# Patient Record
Sex: MALE | Race: WHITE | NOT HISPANIC OR LATINO | Employment: FULL TIME | URBAN - METROPOLITAN AREA
[De-identification: names, ages, dates, MRNs, and addresses within clinical notes are randomized per-mention and may not be internally consistent; named-entity substitution may affect disease eponyms.]

---

## 2024-06-20 ENCOUNTER — HOSPITAL ENCOUNTER (EMERGENCY)
Facility: HOSPITAL | Age: 49
Discharge: HOME | End: 2024-06-20
Attending: EMERGENCY MEDICINE
Payer: COMMERCIAL

## 2024-06-20 ENCOUNTER — APPOINTMENT (OUTPATIENT)
Dept: CARDIOLOGY | Facility: HOSPITAL | Age: 49
End: 2024-06-20
Payer: COMMERCIAL

## 2024-06-20 ENCOUNTER — APPOINTMENT (OUTPATIENT)
Dept: RADIOLOGY | Facility: HOSPITAL | Age: 49
End: 2024-06-20
Payer: COMMERCIAL

## 2024-06-20 VITALS
HEIGHT: 72 IN | BODY MASS INDEX: 30.88 KG/M2 | HEART RATE: 62 BPM | TEMPERATURE: 98.4 F | OXYGEN SATURATION: 98 % | RESPIRATION RATE: 17 BRPM | DIASTOLIC BLOOD PRESSURE: 74 MMHG | WEIGHT: 228 LBS | SYSTOLIC BLOOD PRESSURE: 104 MMHG

## 2024-06-20 DIAGNOSIS — N28.9 FUNCTION KIDNEY DECREASED: ICD-10-CM

## 2024-06-20 DIAGNOSIS — T67.5XXA HEAT EXHAUSTION, INITIAL ENCOUNTER: Primary | ICD-10-CM

## 2024-06-20 DIAGNOSIS — R55 NEAR SYNCOPE: ICD-10-CM

## 2024-06-20 LAB
ALBUMIN SERPL BCP-MCNC: 4.4 G/DL (ref 3.4–5)
ALP SERPL-CCNC: 61 U/L (ref 33–120)
ALT SERPL W P-5'-P-CCNC: 13 U/L (ref 10–52)
ANION GAP SERPL CALC-SCNC: 15 MMOL/L (ref 10–20)
AST SERPL W P-5'-P-CCNC: 16 U/L (ref 9–39)
BASOPHILS # BLD AUTO: 0.11 X10*3/UL (ref 0–0.1)
BASOPHILS NFR BLD AUTO: 0.8 %
BILIRUB SERPL-MCNC: 0.6 MG/DL (ref 0–1.2)
BUN SERPL-MCNC: 16 MG/DL (ref 6–23)
CALCIUM SERPL-MCNC: 9.1 MG/DL (ref 8.6–10.3)
CARDIAC TROPONIN I PNL SERPL HS: 5 NG/L (ref 0–20)
CARDIAC TROPONIN I PNL SERPL HS: 6 NG/L (ref 0–20)
CHLORIDE SERPL-SCNC: 105 MMOL/L (ref 98–107)
CO2 SERPL-SCNC: 23 MMOL/L (ref 21–32)
CREAT SERPL-MCNC: 1.43 MG/DL (ref 0.5–1.3)
EGFRCR SERPLBLD CKD-EPI 2021: 60 ML/MIN/1.73M*2
EOSINOPHIL # BLD AUTO: 0.17 X10*3/UL (ref 0–0.7)
EOSINOPHIL NFR BLD AUTO: 1.3 %
ERYTHROCYTE [DISTWIDTH] IN BLOOD BY AUTOMATED COUNT: 13.2 % (ref 11.5–14.5)
GLUCOSE SERPL-MCNC: 105 MG/DL (ref 74–99)
HCT VFR BLD AUTO: 40.9 % (ref 41–52)
HGB BLD-MCNC: 13.6 G/DL (ref 13.5–17.5)
IMM GRANULOCYTES # BLD AUTO: 0.05 X10*3/UL (ref 0–0.7)
IMM GRANULOCYTES NFR BLD AUTO: 0.4 % (ref 0–0.9)
LYMPHOCYTES # BLD AUTO: 3.21 X10*3/UL (ref 1.2–4.8)
LYMPHOCYTES NFR BLD AUTO: 24.1 %
MAGNESIUM SERPL-MCNC: 2 MG/DL (ref 1.6–2.4)
MCH RBC QN AUTO: 29.2 PG (ref 26–34)
MCHC RBC AUTO-ENTMCNC: 33.3 G/DL (ref 32–36)
MCV RBC AUTO: 88 FL (ref 80–100)
MONOCYTES # BLD AUTO: 1.1 X10*3/UL (ref 0.1–1)
MONOCYTES NFR BLD AUTO: 8.3 %
NEUTROPHILS # BLD AUTO: 8.69 X10*3/UL (ref 1.2–7.7)
NEUTROPHILS NFR BLD AUTO: 65.1 %
NRBC BLD-RTO: 0 /100 WBCS (ref 0–0)
PLATELET # BLD AUTO: 285 X10*3/UL (ref 150–450)
POTASSIUM SERPL-SCNC: 3.3 MMOL/L (ref 3.5–5.3)
PROT SERPL-MCNC: 7.6 G/DL (ref 6.4–8.2)
RBC # BLD AUTO: 4.65 X10*6/UL (ref 4.5–5.9)
SODIUM SERPL-SCNC: 140 MMOL/L (ref 136–145)
WBC # BLD AUTO: 13.3 X10*3/UL (ref 4.4–11.3)

## 2024-06-20 PROCEDURE — 2500000001 HC RX 250 WO HCPCS SELF ADMINISTERED DRUGS (ALT 637 FOR MEDICARE OP): Performed by: EMERGENCY MEDICINE

## 2024-06-20 PROCEDURE — 84484 ASSAY OF TROPONIN QUANT: CPT | Performed by: STUDENT IN AN ORGANIZED HEALTH CARE EDUCATION/TRAINING PROGRAM

## 2024-06-20 PROCEDURE — 71045 X-RAY EXAM CHEST 1 VIEW: CPT | Performed by: RADIOLOGY

## 2024-06-20 PROCEDURE — 71045 X-RAY EXAM CHEST 1 VIEW: CPT

## 2024-06-20 PROCEDURE — 36415 COLL VENOUS BLD VENIPUNCTURE: CPT | Performed by: STUDENT IN AN ORGANIZED HEALTH CARE EDUCATION/TRAINING PROGRAM

## 2024-06-20 PROCEDURE — 99283 EMERGENCY DEPT VISIT LOW MDM: CPT | Mod: 25

## 2024-06-20 PROCEDURE — 93005 ELECTROCARDIOGRAM TRACING: CPT

## 2024-06-20 PROCEDURE — 80053 COMPREHEN METABOLIC PANEL: CPT | Performed by: STUDENT IN AN ORGANIZED HEALTH CARE EDUCATION/TRAINING PROGRAM

## 2024-06-20 PROCEDURE — 85025 COMPLETE CBC W/AUTO DIFF WBC: CPT | Performed by: STUDENT IN AN ORGANIZED HEALTH CARE EDUCATION/TRAINING PROGRAM

## 2024-06-20 PROCEDURE — 99284 EMERGENCY DEPT VISIT MOD MDM: CPT | Performed by: EMERGENCY MEDICINE

## 2024-06-20 PROCEDURE — 83735 ASSAY OF MAGNESIUM: CPT | Performed by: STUDENT IN AN ORGANIZED HEALTH CARE EDUCATION/TRAINING PROGRAM

## 2024-06-20 RX ORDER — POTASSIUM CHLORIDE 1.5 G/1.58G
20 POWDER, FOR SOLUTION ORAL ONCE
Status: COMPLETED | OUTPATIENT
Start: 2024-06-20 | End: 2024-06-20

## 2024-06-20 ASSESSMENT — LIFESTYLE VARIABLES
EVER HAD A DRINK FIRST THING IN THE MORNING TO STEADY YOUR NERVES TO GET RID OF A HANGOVER: NO
HAVE YOU EVER FELT YOU SHOULD CUT DOWN ON YOUR DRINKING: NO
EVER FELT BAD OR GUILTY ABOUT YOUR DRINKING: NO
HAVE PEOPLE ANNOYED YOU BY CRITICIZING YOUR DRINKING: NO
TOTAL SCORE: 0

## 2024-06-20 ASSESSMENT — COLUMBIA-SUICIDE SEVERITY RATING SCALE - C-SSRS
1. IN THE PAST MONTH, HAVE YOU WISHED YOU WERE DEAD OR WISHED YOU COULD GO TO SLEEP AND NOT WAKE UP?: NO
2. HAVE YOU ACTUALLY HAD ANY THOUGHTS OF KILLING YOURSELF?: NO
6. HAVE YOU EVER DONE ANYTHING, STARTED TO DO ANYTHING, OR PREPARED TO DO ANYTHING TO END YOUR LIFE?: NO

## 2024-06-20 ASSESSMENT — PAIN SCALES - GENERAL
PAINLEVEL_OUTOF10: 0 - NO PAIN

## 2024-06-20 ASSESSMENT — PAIN - FUNCTIONAL ASSESSMENT: PAIN_FUNCTIONAL_ASSESSMENT: 0-10

## 2024-06-20 NOTE — ED PROVIDER NOTES
HPI   Chief Complaint   Patient presents with    Weakness, Gen     Pt states that he was working outside all day then went out to eat and was sitting outside the restaurant when he felt weak, dizzy  and hot. Pt states that he did not drink any water today. Pt denies CP and SOB.        48-year-old male present to the emergency room for evaluation of near syncope.  He reporting outside for most the day today with no water intake and only mild consumption of soda.  He attempted to go out for dinner this evening and without drinking any significant amount of alcohol became significantly lightheaded and had to lower himself to the ground before he passed out.  Denies any chest pain, shortness of breath with this.  Denies any previous history of cardiac disease or syncope.  Vital signs are reassuring for EMS and IV established normal saline was initiated.  Normal prehospital glucose.  No actual loss of consciousness or head injury.  He otherwise is asymptomatic at this time.  Previously had some mild nausea and dry heaving when lightheaded but this is since resolved without intervention.                          [place] Coma Scale Score: 15                     Patient History   History reviewed. No pertinent past medical history.  History reviewed. No pertinent surgical history.  No family history on file.  Social History     Tobacco Use    Smoking status: Not on file    Smokeless tobacco: Not on file   Substance Use Topics    Alcohol use: Not on file    Drug use: Not on file       Physical Exam   ED Triage Vitals [06/20/24 1916]   Temperature Heart Rate Respirations BP   36.5 °C (97.7 °F) 78 18 121/78      Pulse Ox Temp Source Heart Rate Source Patient Position   94 % Temporal Monitor Sitting      BP Location FiO2 (%)     Left arm --       Physical Exam  Vitals and nursing note reviewed.   Constitutional:       General: He is not in acute distress.     Appearance: He is well-developed.   HENT:      Head: Normocephalic  and atraumatic.      Nose: No congestion or rhinorrhea.   Eyes:      Conjunctiva/sclera: Conjunctivae normal.   Cardiovascular:      Rate and Rhythm: Normal rate and regular rhythm.      Pulses: Normal pulses.      Heart sounds: No murmur heard.  Pulmonary:      Effort: Pulmonary effort is normal. No respiratory distress.      Breath sounds: Normal breath sounds. No stridor. No wheezing, rhonchi or rales.   Abdominal:      General: Bowel sounds are normal. There is no distension.      Palpations: Abdomen is soft.      Tenderness: There is no abdominal tenderness. There is no guarding or rebound.   Musculoskeletal:         General: No swelling.      Cervical back: Neck supple. No rigidity.      Right lower leg: No edema.      Left lower leg: No edema.   Skin:     General: Skin is warm and dry.      Capillary Refill: Capillary refill takes less than 2 seconds.      Findings: No rash.   Neurological:      Mental Status: He is alert.      Cranial Nerves: No cranial nerve deficit.      Sensory: No sensory deficit.      Motor: No weakness.      Gait: Gait normal.      Comments: Cranial nerves II through XII intact.  Strength 5 out of 5 in all 4 extremities.  Sensation intact to light touch in all 4 extremities.  No dysdiadochokinesia, no dysmetria.  Gait is narrow and stable.   Psychiatric:         Mood and Affect: Mood normal.         Behavior: Behavior normal.         Thought Content: Thought content normal.         ED Course & MDM   ED Course as of 06/20/24 2202   Thu Jun 20, 2024 2015 Mild reduction in kidney function appreciated.  Suspect likely prerenal in the setting of his likely dehydration.  Potassium 3.3 will replete orally at this time. [TL]   2036 No evidence for acute cardiopulmonary process. If there is clinical  concern for acute aortic pathology or pulmonary embolic disease,  further evaluated with chest CT should be considered.   [TL]   2119 Repeat troponin negative. [TL]   2120 Will discharge patient  at this time.  PCP follow-up and repeat kidney function testing in 1 week recommended.  Able to ambulate around the emergency room without difficulty. [TL]      ED Course User Index  [TL] Zach Lopez DO         Diagnoses as of 06/20/24 2202   Heat exhaustion, initial encounter   Near syncope   Function kidney decreased       Medical Decision Making  Well-appearing 48-year-old male presenting for near syncope.  I suspect there is a potential component of heat exhaustion given significant time spent outside in the greater than 90 degree heat today with minimal oral fluid intake.  Thankfully vital signs are reassuring at this time.  Patient is neurologically intact with no sign of heatstroke.  EKG, serial troponin, base laboratory studies to be obtained.  Will continue to monitor the patient at this time.  He has a normal neurologic exam and no sign of acute intracranial hemorrhage, stroke.  No complaints of headache or blurry vision.  These were all considered.  No sign of infectious etiology and patient otherwise been feeling well throughout the last Nayeli days.  He is a fairly healthy gentleman with only past medical history that he endorses of GERD, anxiety, seasonal allergies on Claritin.        Procedure  Procedures     Zach Lopez DO  06/20/24 2202

## 2024-06-21 LAB
ATRIAL RATE: 61 BPM
ATRIAL RATE: 70 BPM
P AXIS: 28 DEGREES
P AXIS: 30 DEGREES
P OFFSET: 194 MS
P OFFSET: 195 MS
P ONSET: 140 MS
P ONSET: 142 MS
PR INTERVAL: 158 MS
PR INTERVAL: 166 MS
Q ONSET: 221 MS
Q ONSET: 223 MS
QRS COUNT: 11 BEATS
QRS COUNT: 11 BEATS
QRS DURATION: 100 MS
QRS DURATION: 102 MS
QT INTERVAL: 396 MS
QT INTERVAL: 424 MS
QTC CALCULATION(BAZETT): 426 MS
QTC CALCULATION(BAZETT): 427 MS
QTC FREDERICIA: 417 MS
QTC FREDERICIA: 426 MS
R AXIS: 16 DEGREES
R AXIS: 4 DEGREES
T AXIS: 32 DEGREES
T AXIS: 35 DEGREES
T OFFSET: 419 MS
T OFFSET: 435 MS
VENTRICULAR RATE: 61 BPM
VENTRICULAR RATE: 70 BPM

## 2024-06-21 NOTE — DISCHARGE INSTRUCTIONS
Follow up with your doctor(s) in one to two days.  Follow up and review all labs and imaging results with your doctor(s)    Please return to this or the nearest Emergency Medical facility for any new or worsening symptoms.    If you were given a prescription medication, you should review all risks and side effects on the package insert and discuss these and the medication with your pharmacist    Please drink plenty of fluids over the next 4 days.  Have your kidney function reassessed in 1 week via lab work from your PCP.

## 2024-09-11 ENCOUNTER — APPOINTMENT (OUTPATIENT)
Dept: CARDIOLOGY | Facility: HOSPITAL | Age: 49
End: 2024-09-11
Payer: COMMERCIAL

## 2024-09-11 ENCOUNTER — APPOINTMENT (OUTPATIENT)
Dept: RADIOLOGY | Facility: HOSPITAL | Age: 49
End: 2024-09-11
Payer: COMMERCIAL

## 2024-09-11 ENCOUNTER — HOSPITAL ENCOUNTER (OUTPATIENT)
Facility: HOSPITAL | Age: 49
Setting detail: OBSERVATION
Discharge: HOME | End: 2024-09-12
Attending: EMERGENCY MEDICINE | Admitting: STUDENT IN AN ORGANIZED HEALTH CARE EDUCATION/TRAINING PROGRAM
Payer: COMMERCIAL

## 2024-09-11 DIAGNOSIS — R07.9 CHEST PAIN ON EXERTION: ICD-10-CM

## 2024-09-11 DIAGNOSIS — R07.89 CHEST DISCOMFORT: ICD-10-CM

## 2024-09-11 DIAGNOSIS — R07.9 CHEST PAIN AT REST: Primary | ICD-10-CM

## 2024-09-11 DIAGNOSIS — R07.9 CHEST PAIN, UNSPECIFIED TYPE: ICD-10-CM

## 2024-09-11 DIAGNOSIS — R00.2 PALPITATIONS: ICD-10-CM

## 2024-09-11 PROBLEM — Z86.39 HISTORY OF OBESITY: Status: ACTIVE | Noted: 2021-06-24

## 2024-09-11 PROBLEM — F98.8 ADD (ATTENTION DEFICIT DISORDER): Status: ACTIVE | Noted: 2024-09-11

## 2024-09-11 PROBLEM — G61.0 GUILLAIN BARRÉ SYNDROME (MULTI): Status: ACTIVE | Noted: 2024-09-11

## 2024-09-11 PROBLEM — K80.50 BILIARY COLIC: Status: ACTIVE | Noted: 2021-06-28

## 2024-09-11 LAB
ALBUMIN SERPL BCP-MCNC: 4.2 G/DL (ref 3.4–5)
ALP SERPL-CCNC: 68 U/L (ref 33–120)
ALT SERPL W P-5'-P-CCNC: 14 U/L (ref 10–52)
ANION GAP SERPL CALC-SCNC: 11 MMOL/L (ref 10–20)
APTT PPP: 27 SECONDS (ref 27–38)
AST SERPL W P-5'-P-CCNC: 18 U/L (ref 9–39)
BASOPHILS # BLD AUTO: 0.08 X10*3/UL (ref 0–0.1)
BASOPHILS NFR BLD AUTO: 0.9 %
BILIRUB SERPL-MCNC: 0.4 MG/DL (ref 0–1.2)
BUN SERPL-MCNC: 13 MG/DL (ref 6–23)
CALCIUM SERPL-MCNC: 9.1 MG/DL (ref 8.6–10.3)
CARDIAC TROPONIN I PNL SERPL HS: 3 NG/L (ref 0–20)
CARDIAC TROPONIN I PNL SERPL HS: 4 NG/L (ref 0–20)
CHLORIDE SERPL-SCNC: 103 MMOL/L (ref 98–107)
CHOLEST SERPL-MCNC: 217 MG/DL (ref 0–199)
CHOLESTEROL/HDL RATIO: 3.4
CO2 SERPL-SCNC: 27 MMOL/L (ref 21–32)
CREAT SERPL-MCNC: 1.13 MG/DL (ref 0.5–1.3)
EGFRCR SERPLBLD CKD-EPI 2021: 79 ML/MIN/1.73M*2
EOSINOPHIL # BLD AUTO: 0.15 X10*3/UL (ref 0–0.7)
EOSINOPHIL NFR BLD AUTO: 1.7 %
ERYTHROCYTE [DISTWIDTH] IN BLOOD BY AUTOMATED COUNT: 12.8 % (ref 11.5–14.5)
GLUCOSE SERPL-MCNC: 92 MG/DL (ref 74–99)
HCT VFR BLD AUTO: 40 % (ref 41–52)
HDLC SERPL-MCNC: 63.7 MG/DL
HGB BLD-MCNC: 13.5 G/DL (ref 13.5–17.5)
HOLD SPECIMEN: NORMAL
HOLD SPECIMEN: NORMAL
IMM GRANULOCYTES # BLD AUTO: 0.05 X10*3/UL (ref 0–0.7)
IMM GRANULOCYTES NFR BLD AUTO: 0.6 % (ref 0–0.9)
INR PPP: 1.1 (ref 0.9–1.1)
LDLC SERPL CALC-MCNC: 135 MG/DL
LYMPHOCYTES # BLD AUTO: 2.81 X10*3/UL (ref 1.2–4.8)
LYMPHOCYTES NFR BLD AUTO: 31.4 %
MAGNESIUM SERPL-MCNC: 2.02 MG/DL (ref 1.6–2.4)
MCH RBC QN AUTO: 29.6 PG (ref 26–34)
MCHC RBC AUTO-ENTMCNC: 33.8 G/DL (ref 32–36)
MCV RBC AUTO: 88 FL (ref 80–100)
MONOCYTES # BLD AUTO: 0.84 X10*3/UL (ref 0.1–1)
MONOCYTES NFR BLD AUTO: 9.4 %
NEUTROPHILS # BLD AUTO: 5.03 X10*3/UL (ref 1.2–7.7)
NEUTROPHILS NFR BLD AUTO: 56 %
NON HDL CHOLESTEROL: 153 MG/DL (ref 0–149)
NRBC BLD-RTO: 0 /100 WBCS (ref 0–0)
PLATELET # BLD AUTO: 311 X10*3/UL (ref 150–450)
POTASSIUM SERPL-SCNC: 4.1 MMOL/L (ref 3.5–5.3)
PROT SERPL-MCNC: 7.5 G/DL (ref 6.4–8.2)
PROTHROMBIN TIME: 12.8 SECONDS (ref 9.8–12.8)
RBC # BLD AUTO: 4.56 X10*6/UL (ref 4.5–5.9)
SODIUM SERPL-SCNC: 137 MMOL/L (ref 136–145)
TRIGL SERPL-MCNC: 92 MG/DL (ref 0–149)
VLDL: 18 MG/DL (ref 0–40)
WBC # BLD AUTO: 9 X10*3/UL (ref 4.4–11.3)

## 2024-09-11 PROCEDURE — 2500000004 HC RX 250 GENERAL PHARMACY W/ HCPCS (ALT 636 FOR OP/ED)

## 2024-09-11 PROCEDURE — 84484 ASSAY OF TROPONIN QUANT: CPT

## 2024-09-11 PROCEDURE — 36415 COLL VENOUS BLD VENIPUNCTURE: CPT

## 2024-09-11 PROCEDURE — 71045 X-RAY EXAM CHEST 1 VIEW: CPT

## 2024-09-11 PROCEDURE — 2500000001 HC RX 250 WO HCPCS SELF ADMINISTERED DRUGS (ALT 637 FOR MEDICARE OP)

## 2024-09-11 PROCEDURE — 93005 ELECTROCARDIOGRAM TRACING: CPT

## 2024-09-11 PROCEDURE — 85025 COMPLETE CBC W/AUTO DIFF WBC: CPT

## 2024-09-11 PROCEDURE — 96372 THER/PROPH/DIAG INJ SC/IM: CPT

## 2024-09-11 PROCEDURE — 99285 EMERGENCY DEPT VISIT HI MDM: CPT | Performed by: EMERGENCY MEDICINE

## 2024-09-11 PROCEDURE — 83735 ASSAY OF MAGNESIUM: CPT

## 2024-09-11 PROCEDURE — 99285 EMERGENCY DEPT VISIT HI MDM: CPT

## 2024-09-11 PROCEDURE — 83718 ASSAY OF LIPOPROTEIN: CPT

## 2024-09-11 PROCEDURE — 85610 PROTHROMBIN TIME: CPT

## 2024-09-11 PROCEDURE — 93010 ELECTROCARDIOGRAM REPORT: CPT | Performed by: EMERGENCY MEDICINE

## 2024-09-11 PROCEDURE — 71045 X-RAY EXAM CHEST 1 VIEW: CPT | Mod: FOREIGN READ | Performed by: RADIOLOGY

## 2024-09-11 PROCEDURE — 83036 HEMOGLOBIN GLYCOSYLATED A1C: CPT | Mod: STJLAB

## 2024-09-11 PROCEDURE — G0378 HOSPITAL OBSERVATION PER HR: HCPCS

## 2024-09-11 PROCEDURE — 80053 COMPREHEN METABOLIC PANEL: CPT

## 2024-09-11 RX ORDER — SERTRALINE HYDROCHLORIDE 100 MG/1
100 TABLET, FILM COATED ORAL DAILY
Status: DISCONTINUED | OUTPATIENT
Start: 2024-09-12 | End: 2024-09-12 | Stop reason: HOSPADM

## 2024-09-11 RX ORDER — NAPROXEN SODIUM 220 MG/1
324 TABLET, FILM COATED ORAL ONCE
Status: DISCONTINUED | OUTPATIENT
Start: 2024-09-11 | End: 2024-09-11

## 2024-09-11 RX ORDER — ROPINIROLE 1 MG/1
1 TABLET, FILM COATED ORAL 2 TIMES DAILY
COMMUNITY

## 2024-09-11 RX ORDER — AMINOPHYLLINE 25 MG/ML
125 INJECTION, SOLUTION INTRAVENOUS AS NEEDED
Status: DISCONTINUED | OUTPATIENT
Start: 2024-09-11 | End: 2024-09-12

## 2024-09-11 RX ORDER — ROPINIROLE 1 MG/1
1 TABLET, FILM COATED ORAL 2 TIMES DAILY
Status: DISCONTINUED | OUTPATIENT
Start: 2024-09-11 | End: 2024-09-12 | Stop reason: HOSPADM

## 2024-09-11 RX ORDER — POLYETHYLENE GLYCOL 3350 17 G/17G
17 POWDER, FOR SOLUTION ORAL DAILY
Status: DISCONTINUED | OUTPATIENT
Start: 2024-09-11 | End: 2024-09-12 | Stop reason: HOSPADM

## 2024-09-11 RX ORDER — CETIRIZINE HYDROCHLORIDE 10 MG/1
10 TABLET ORAL DAILY
Status: DISCONTINUED | OUTPATIENT
Start: 2024-09-11 | End: 2024-09-12 | Stop reason: HOSPADM

## 2024-09-11 RX ORDER — REGADENOSON 0.08 MG/ML
0.4 INJECTION, SOLUTION INTRAVENOUS
Status: DISCONTINUED | OUTPATIENT
Start: 2024-09-11 | End: 2024-09-12

## 2024-09-11 RX ORDER — DEXTROAMPHETAMINE SACCHARATE, AMPHETAMINE ASPARTATE MONOHYDRATE, DEXTROAMPHETAMINE SULFATE AND AMPHETAMINE SULFATE 6.25; 6.25; 6.25; 6.25 MG/1; MG/1; MG/1; MG/1
25 CAPSULE, EXTENDED RELEASE ORAL EVERY MORNING
COMMUNITY

## 2024-09-11 RX ORDER — ENOXAPARIN SODIUM 100 MG/ML
40 INJECTION SUBCUTANEOUS EVERY 24 HOURS
Status: DISCONTINUED | OUTPATIENT
Start: 2024-09-11 | End: 2024-09-12 | Stop reason: HOSPADM

## 2024-09-11 RX ORDER — SERTRALINE HYDROCHLORIDE 100 MG/1
100 TABLET, FILM COATED ORAL DAILY
COMMUNITY

## 2024-09-11 RX ORDER — ASPIRIN 81 MG/1
81 TABLET ORAL DAILY
Status: DISCONTINUED | OUTPATIENT
Start: 2024-09-12 | End: 2024-09-12 | Stop reason: HOSPADM

## 2024-09-11 RX ORDER — GABAPENTIN 600 MG/1
600 TABLET ORAL 3 TIMES DAILY
COMMUNITY

## 2024-09-11 RX ORDER — GABAPENTIN 600 MG/1
600 TABLET ORAL 3 TIMES DAILY
Status: DISCONTINUED | OUTPATIENT
Start: 2024-09-11 | End: 2024-09-12 | Stop reason: HOSPADM

## 2024-09-11 RX ORDER — LORATADINE 10 MG/1
10 TABLET ORAL DAILY
COMMUNITY

## 2024-09-11 RX ADMIN — CETIRIZINE HYDROCHLORIDE 10 MG: 10 TABLET, FILM COATED ORAL at 21:28

## 2024-09-11 RX ADMIN — GABAPENTIN 600 MG: 600 TABLET, FILM COATED ORAL at 21:28

## 2024-09-11 RX ADMIN — ROPINIROLE HYDROCHLORIDE 1 MG: 1 TABLET, FILM COATED ORAL at 21:28

## 2024-09-11 RX ADMIN — ENOXAPARIN SODIUM 40 MG: 40 INJECTION SUBCUTANEOUS at 21:28

## 2024-09-11 SDOH — SOCIAL STABILITY: SOCIAL INSECURITY: HAVE YOU HAD ANY THOUGHTS OF HARMING ANYONE ELSE?: NO

## 2024-09-11 SDOH — SOCIAL STABILITY: SOCIAL INSECURITY: DO YOU FEEL ANYONE HAS EXPLOITED OR TAKEN ADVANTAGE OF YOU FINANCIALLY OR OF YOUR PERSONAL PROPERTY?: NO

## 2024-09-11 SDOH — SOCIAL STABILITY: SOCIAL INSECURITY: HAS ANYONE EVER THREATENED TO HURT YOUR FAMILY OR YOUR PETS?: NO

## 2024-09-11 SDOH — HEALTH STABILITY: MENTAL HEALTH: EXPERIENCED ANY OF THE FOLLOWING LIFE EVENTS: OTHER (COMMENT)

## 2024-09-11 SDOH — SOCIAL STABILITY: SOCIAL INSECURITY: HAVE YOU HAD THOUGHTS OF HARMING ANYONE ELSE?: NO

## 2024-09-11 SDOH — SOCIAL STABILITY: SOCIAL INSECURITY: WERE YOU ABLE TO COMPLETE ALL THE BEHAVIORAL HEALTH SCREENINGS?: YES

## 2024-09-11 SDOH — SOCIAL STABILITY: SOCIAL INSECURITY: ARE THERE ANY APPARENT SIGNS OF INJURIES/BEHAVIORS THAT COULD BE RELATED TO ABUSE/NEGLECT?: NO

## 2024-09-11 SDOH — SOCIAL STABILITY: SOCIAL INSECURITY: DO YOU FEEL UNSAFE GOING BACK TO THE PLACE WHERE YOU ARE LIVING?: NO

## 2024-09-11 SDOH — SOCIAL STABILITY: SOCIAL INSECURITY: ARE YOU OR HAVE YOU BEEN THREATENED OR ABUSED PHYSICALLY, EMOTIONALLY, OR SEXUALLY BY ANYONE?: NO

## 2024-09-11 SDOH — SOCIAL STABILITY: SOCIAL INSECURITY: DOES ANYONE TRY TO KEEP YOU FROM HAVING/CONTACTING OTHER FRIENDS OR DOING THINGS OUTSIDE YOUR HOME?: NO

## 2024-09-11 ASSESSMENT — COGNITIVE AND FUNCTIONAL STATUS - GENERAL
DAILY ACTIVITIY SCORE: 24
MOBILITY SCORE: 24
MOBILITY SCORE: 24
DAILY ACTIVITIY SCORE: 24
PATIENT BASELINE BEDBOUND: NO

## 2024-09-11 ASSESSMENT — ENCOUNTER SYMPTOMS
ABDOMINAL PAIN: 0
NUMBNESS: 0
HEADACHES: 0
DIZZINESS: 1
PALPITATIONS: 1
SHORTNESS OF BREATH: 1
WEAKNESS: 0
NAUSEA: 1
DIAPHORESIS: 1

## 2024-09-11 ASSESSMENT — LIFESTYLE VARIABLES
PRESCIPTION_ABUSE_PAST_12_MONTHS: NO
HOW OFTEN DO YOU HAVE A DRINK CONTAINING ALCOHOL: NEVER
AUDIT-C TOTAL SCORE: 0
HOW OFTEN DO YOU HAVE 6 OR MORE DRINKS ON ONE OCCASION: NEVER
SUBSTANCE_ABUSE_PAST_12_MONTHS: NO
SKIP TO QUESTIONS 9-10: 1
HOW MANY STANDARD DRINKS CONTAINING ALCOHOL DO YOU HAVE ON A TYPICAL DAY: PATIENT DOES NOT DRINK
AUDIT-C TOTAL SCORE: 0

## 2024-09-11 ASSESSMENT — ACTIVITIES OF DAILY LIVING (ADL)
HEARING - LEFT EAR: FUNCTIONAL
TOILETING: INDEPENDENT
PATIENT'S MEMORY ADEQUATE TO SAFELY COMPLETE DAILY ACTIVITIES?: YES
LACK_OF_TRANSPORTATION: NO
WALKS IN HOME: INDEPENDENT
HEARING - RIGHT EAR: FUNCTIONAL
FEEDING YOURSELF: INDEPENDENT
JUDGMENT_ADEQUATE_SAFELY_COMPLETE_DAILY_ACTIVITIES: YES
DRESSING YOURSELF: INDEPENDENT
BATHING: INDEPENDENT
GROOMING: INDEPENDENT
ADEQUATE_TO_COMPLETE_ADL: YES

## 2024-09-11 ASSESSMENT — HEART SCORE
ECG: NORMAL
HEART SCORE: 4
TROPONIN: LESS THAN OR EQUAL TO NORMAL LIMIT
RISK FACTORS: 1-2 RISK FACTORS
AGE: 45-64
HISTORY: HIGHLY SUSPICIOUS

## 2024-09-11 ASSESSMENT — PATIENT HEALTH QUESTIONNAIRE - PHQ9
1. LITTLE INTEREST OR PLEASURE IN DOING THINGS: NOT AT ALL
SUM OF ALL RESPONSES TO PHQ9 QUESTIONS 1 & 2: 1
2. FEELING DOWN, DEPRESSED OR HOPELESS: SEVERAL DAYS

## 2024-09-11 ASSESSMENT — COLUMBIA-SUICIDE SEVERITY RATING SCALE - C-SSRS
6. HAVE YOU EVER DONE ANYTHING, STARTED TO DO ANYTHING, OR PREPARED TO DO ANYTHING TO END YOUR LIFE?: NO
2. HAVE YOU ACTUALLY HAD ANY THOUGHTS OF KILLING YOURSELF?: NO
1. IN THE PAST MONTH, HAVE YOU WISHED YOU WERE DEAD OR WISHED YOU COULD GO TO SLEEP AND NOT WAKE UP?: NO

## 2024-09-11 ASSESSMENT — PAIN DESCRIPTION - LOCATION: LOCATION: HEAD

## 2024-09-11 ASSESSMENT — PAIN SCALES - GENERAL: PAINLEVEL_OUTOF10: 3

## 2024-09-11 ASSESSMENT — PAIN - FUNCTIONAL ASSESSMENT: PAIN_FUNCTIONAL_ASSESSMENT: 0-10

## 2024-09-11 NOTE — ED PROVIDER NOTES
HPI   Chief Complaint   Patient presents with    Chest Pain     Pt presents to ED from home with left sided CP, radiating into left shoulder/arm, states it has been ongoing for a few weeks-around 1800 this evening pt developed CP, diaphoresis, and nausea, received 4mg zofran and 325asa with EMS        Patient is a 49-year-old male with a history of ADD, Galena Barré syndrome, and chest pain who presents to the emergency department for evaluation of chest pain, nausea, and sweating. David states that his pain has been persistent for the past two weeks over the left side of his chest and radiating into his left shoulder. Today he reports sitting still at home when his heart rate went into the 130's and was accompanied by intense 10/10 chest pain that radiated up to this neck, left shoulder, and to the right side of his back. He reports mild head ache and tingling of his hands b/l at present which he has been seen for by a physician. He denies any current chest pain, SOB on exertion, swelling of extremities, pain behind calves.   Medications: takes sertraline and ropinirole as prescribed, Received 4mg zofran and 325 asa via EMS  Allergies: compazine  Family Hx: mother and grandparents with history of coronary artery disease  Recreational Drug use: THC          History provided by:  Patient   used: No            Patient History   Past Medical History:   Diagnosis Date    ADD (attention deficit disorder)     Depression     Restless leg syndrome      Past Surgical History:   Procedure Laterality Date    GASTRIC BYPASS  2016    sleeve     Family History   Problem Relation Name Age of Onset    Stroke Mother       Social History     Tobacco Use    Smoking status: Never    Smokeless tobacco: Never   Substance Use Topics    Alcohol use: Not Currently    Drug use: Yes     Types: Marijuana     Comment: vapes/smokes/edibles       Physical Exam   ED Triage Vitals [09/11/24 1902]   Temperature Heart Rate  Respirations BP   36.1 °C (97 °F) 53 18 --      Pulse Ox Temp Source Heart Rate Source Patient Position   95 % Temporal Monitor Lying      BP Location FiO2 (%)     Right arm --       Physical Exam  Constitutional:       General: He is not in acute distress.     Appearance: He is not ill-appearing, toxic-appearing or diaphoretic.   Neck:      Vascular: No JVD.   Cardiovascular:      Rate and Rhythm: Normal rate and regular rhythm.      Pulses:           Radial pulses are 2+ on the right side and 2+ on the left side.        Posterior tibial pulses are 2+ on the right side and 2+ on the left side.      Heart sounds: Normal heart sounds. Heart sounds not distant. No murmur heard.     No systolic murmur is present.      No diastolic murmur is present.      Comments: BP taken on right arm 113/74, BP taken on left arm 112/74  Pulmonary:      Effort: No tachypnea, accessory muscle usage or respiratory distress.      Breath sounds: Normal breath sounds. No stridor. No decreased breath sounds, wheezing, rhonchi or rales.   Chest:      Chest wall: No tenderness or edema.   Abdominal:      General: There is no abdominal bruit.      Tenderness: There is no abdominal tenderness. There is no guarding or rebound.   Musculoskeletal:      Cervical back: Normal range of motion and neck supple.   Skin:     General: Skin is warm and dry.      Capillary Refill: Capillary refill takes less than 2 seconds.   Neurological:      General: No focal deficit present.      Mental Status: He is alert and oriented to person, place, and time.   Psychiatric:         Mood and Affect: Mood normal.         Behavior: Behavior normal.           ED Course & MDM   ED Course as of 09/11/24 2259   Wed Sep 11, 2024   1909 EKG performed at 19: 07 and independently reviewed by provider: Reveals sinus bradycardia with a rate of 58 bpm, normal axis, normal intervals, no ST changes, no T wave abnormalities, no ectopy. No STEMI. [TL]      ED Course User Index  [TL]  Zach Lopez DO                 No data recorded       HEART Score: 4 (09/11/24 2027 : Gage Renee DO)                         Medical Decision Making  Patient is a 49-year-old male with a history of ADD, Josafat Barré syndrome, and chest pain who presents to the emergency department for evaluation of chest pain, nausea, and sweating. He was brought in by EMS and given 4 mg zofran and 325 asa in route to ED.  In ED, vitals stable and pt in no acute distress. CBC, CMP unremarkable,     EKG: Sinus bradycardia with a ventricular rate of 58.  .  QTc 428.  No ST changes.    Repeat EKG: Sinus bradycardia with a rate of 57.  .  QTc 432.  No ST changes.    Initial troponin negative.  Chemistry unremarkable.  CBC unremarkable.  Chest x-ray unremarkable.      Concern for stable angina, currently his heart score is 4, he was admitted to the hospital for further management.    Amount and/or Complexity of Data Reviewed  ECG/medicine tests: ordered. Decision-making details documented in ED Course.        Procedure  Procedures     Mendoza Barfield DO  Resident  09/11/24 2673       Mendoza Barfield DO  Resident  09/11/24 7192

## 2024-09-12 ENCOUNTER — APPOINTMENT (OUTPATIENT)
Dept: CARDIOLOGY | Facility: HOSPITAL | Age: 49
End: 2024-09-12
Payer: COMMERCIAL

## 2024-09-12 VITALS
OXYGEN SATURATION: 95 % | TEMPERATURE: 96.8 F | SYSTOLIC BLOOD PRESSURE: 109 MMHG | HEART RATE: 72 BPM | WEIGHT: 225 LBS | DIASTOLIC BLOOD PRESSURE: 64 MMHG | HEIGHT: 72 IN | RESPIRATION RATE: 18 BRPM | BODY MASS INDEX: 30.48 KG/M2

## 2024-09-12 LAB
ALBUMIN SERPL BCP-MCNC: 3.7 G/DL (ref 3.4–5)
ANION GAP SERPL CALC-SCNC: 12 MMOL/L (ref 10–20)
AORTIC VALVE MEAN GRADIENT: 2.8 MMHG
AORTIC VALVE PEAK VELOCITY: 1.11 M/S
AV PEAK GRADIENT: 4.9 MMHG
AVA (PEAK VEL): 2.66 CM2
AVA (VTI): 2.65 CM2
BODY SURFACE AREA: 2.28 M2
BUN SERPL-MCNC: 14 MG/DL (ref 6–23)
CALCIUM SERPL-MCNC: 9 MG/DL (ref 8.6–10.3)
CHLORIDE SERPL-SCNC: 105 MMOL/L (ref 98–107)
CO2 SERPL-SCNC: 26 MMOL/L (ref 21–32)
CREAT SERPL-MCNC: 1.05 MG/DL (ref 0.5–1.3)
EGFRCR SERPLBLD CKD-EPI 2021: 86 ML/MIN/1.73M*2
EJECTION FRACTION APICAL 4 CHAMBER: 53.3
EJECTION FRACTION: 55 %
ERYTHROCYTE [DISTWIDTH] IN BLOOD BY AUTOMATED COUNT: 12.9 % (ref 11.5–14.5)
EST. AVERAGE GLUCOSE BLD GHB EST-MCNC: 105 MG/DL
GLUCOSE SERPL-MCNC: 91 MG/DL (ref 74–99)
HBA1C MFR BLD: 5.3 %
HCT VFR BLD AUTO: 39.4 % (ref 41–52)
HGB BLD-MCNC: 12.8 G/DL (ref 13.5–17.5)
LEFT ATRIUM VOLUME AREA LENGTH INDEX BSA: 17.8 ML/M2
LEFT VENTRICLE INTERNAL DIMENSION DIASTOLE: 4.16 CM (ref 3.5–6)
LEFT VENTRICULAR OUTFLOW TRACT DIAMETER: 1.96 CM
LV EJECTION FRACTION BIPLANE: 55 %
MAGNESIUM SERPL-MCNC: 2.04 MG/DL (ref 1.6–2.4)
MCH RBC QN AUTO: 29.5 PG (ref 26–34)
MCHC RBC AUTO-ENTMCNC: 32.5 G/DL (ref 32–36)
MCV RBC AUTO: 91 FL (ref 80–100)
MITRAL VALVE E/A RATIO: 1.49
NRBC BLD-RTO: 0 /100 WBCS (ref 0–0)
PHOSPHATE SERPL-MCNC: 4 MG/DL (ref 2.5–4.9)
PLATELET # BLD AUTO: 254 X10*3/UL (ref 150–450)
POTASSIUM SERPL-SCNC: 3.9 MMOL/L (ref 3.5–5.3)
RBC # BLD AUTO: 4.34 X10*6/UL (ref 4.5–5.9)
RIGHT VENTRICLE FREE WALL PEAK S': 11 CM/S
RIGHT VENTRICLE PEAK SYSTOLIC PRESSURE: 8.8 MMHG
SODIUM SERPL-SCNC: 139 MMOL/L (ref 136–145)
TRICUSPID ANNULAR PLANE SYSTOLIC EXCURSION: 1.8 CM
WBC # BLD AUTO: 8.1 X10*3/UL (ref 4.4–11.3)

## 2024-09-12 PROCEDURE — 93016 CV STRESS TEST SUPVJ ONLY: CPT | Performed by: INTERNAL MEDICINE

## 2024-09-12 PROCEDURE — 2500000004 HC RX 250 GENERAL PHARMACY W/ HCPCS (ALT 636 FOR OP/ED)

## 2024-09-12 PROCEDURE — 93018 CV STRESS TEST I&R ONLY: CPT | Performed by: INTERNAL MEDICINE

## 2024-09-12 PROCEDURE — 99235 HOSP IP/OBS SAME DATE MOD 70: CPT

## 2024-09-12 PROCEDURE — 93017 CV STRESS TEST TRACING ONLY: CPT

## 2024-09-12 PROCEDURE — 93225 XTRNL ECG REC<48 HRS REC: CPT

## 2024-09-12 PROCEDURE — 85027 COMPLETE CBC AUTOMATED: CPT

## 2024-09-12 PROCEDURE — 99223 1ST HOSP IP/OBS HIGH 75: CPT | Performed by: INTERNAL MEDICINE

## 2024-09-12 PROCEDURE — 2500000002 HC RX 250 W HCPCS SELF ADMINISTERED DRUGS (ALT 637 FOR MEDICARE OP, ALT 636 FOR OP/ED)

## 2024-09-12 PROCEDURE — 93306 TTE W/DOPPLER COMPLETE: CPT | Performed by: INTERNAL MEDICINE

## 2024-09-12 PROCEDURE — 93306 TTE W/DOPPLER COMPLETE: CPT

## 2024-09-12 PROCEDURE — 80069 RENAL FUNCTION PANEL: CPT

## 2024-09-12 PROCEDURE — G0378 HOSPITAL OBSERVATION PER HR: HCPCS

## 2024-09-12 PROCEDURE — 36415 COLL VENOUS BLD VENIPUNCTURE: CPT

## 2024-09-12 PROCEDURE — 2500000001 HC RX 250 WO HCPCS SELF ADMINISTERED DRUGS (ALT 637 FOR MEDICARE OP)

## 2024-09-12 PROCEDURE — 96374 THER/PROPH/DIAG INJ IV PUSH: CPT | Mod: 59

## 2024-09-12 PROCEDURE — 83735 ASSAY OF MAGNESIUM: CPT

## 2024-09-12 RX ORDER — PANTOPRAZOLE SODIUM 40 MG/10ML
40 INJECTION, POWDER, LYOPHILIZED, FOR SOLUTION INTRAVENOUS DAILY
Status: DISCONTINUED | OUTPATIENT
Start: 2024-09-12 | End: 2024-09-12

## 2024-09-12 RX ORDER — PANTOPRAZOLE SODIUM 40 MG/1
40 TABLET, DELAYED RELEASE ORAL
Status: DISCONTINUED | OUTPATIENT
Start: 2024-09-13 | End: 2024-09-12 | Stop reason: HOSPADM

## 2024-09-12 RX ORDER — PANTOPRAZOLE SODIUM 40 MG/1
40 TABLET, DELAYED RELEASE ORAL
Qty: 30 TABLET | Refills: 0 | Status: SHIPPED | OUTPATIENT
Start: 2024-09-12 | End: 2024-10-12

## 2024-09-12 RX ADMIN — GABAPENTIN 600 MG: 600 TABLET, FILM COATED ORAL at 10:14

## 2024-09-12 RX ADMIN — PANTOPRAZOLE SODIUM 40 MG: 40 INJECTION, POWDER, FOR SOLUTION INTRAVENOUS at 10:07

## 2024-09-12 RX ADMIN — CETIRIZINE HYDROCHLORIDE 10 MG: 10 TABLET, FILM COATED ORAL at 10:12

## 2024-09-12 RX ADMIN — SERTRALINE 100 MG: 100 TABLET, FILM COATED ORAL at 10:11

## 2024-09-12 RX ADMIN — ASPIRIN 81 MG: 81 TABLET, COATED ORAL at 10:14

## 2024-09-12 ASSESSMENT — COGNITIVE AND FUNCTIONAL STATUS - GENERAL
MOBILITY SCORE: 24
DAILY ACTIVITIY SCORE: 24

## 2024-09-12 ASSESSMENT — ACTIVITIES OF DAILY LIVING (ADL): LACK_OF_TRANSPORTATION: NO

## 2024-09-12 NOTE — DISCHARGE SUMMARY
Discharge Diagnosis  Atypical chest pain     Issues Requiring Follow-Up  Follow up with cardiology (Dr. Bangura) in 2-3 weeks tod discuss results of Holter monitor findings.     Follow up with PCP to discuss need for Adderall as it can exacerbate chest pain and possible trial of pantoprazole for possible GI symptoms contributing to chest pain.     Discharge Meds     Medication List      START taking these medications     pantoprazole 40 mg EC tablet; Commonly known as: ProtoNix; Take 1 tablet   (40 mg) by mouth early in the morning.. Do not crush, chew, or split.     CONTINUE taking these medications     amphetamine-dextroamphetamine XR 25 mg 24 hr capsule; Commonly known as:   Adderall XR   gabapentin 600 mg tablet; Commonly known as: Neurontin   loratadine 10 mg tablet; Commonly known as: Claritin   rOPINIRole 1 mg tablet; Commonly known as: Requip   sertraline 100 mg tablet; Commonly known as: Zoloft       Test Results Pending At Discharge  Pending Labs       No current pending labs.            Hospital Course  Mr. Whitney is a 50 yo male who presented for chest pain associated with nausea/vomiting and diaphoresis. He has a past medical history of ADHD on Adderall, restless leg syndrome, GBS/Lyme disease, and depression. He does vape/smokes and eats marijuana daily. On presentation to the ED he was afebrile, normotensive, and bradycardic HR 53. CBC, troponin, and CMP were all within normal limits. CXR negative for acute cardiopulmonary processes. EKG showed normal sinus rhythm with bradycardia, rate 58, no ST segment changes or evidence of acute ischemia. He was admitted for further workup of chest pain. On admission he received an ASA load with 324 mg and cardiology was consulted. Hemoglobin A1c (5.5%) and lipid panel (total cholesterol 217, HDL 63.7, ) were ordered. ECHO on 9/12 showed EF 55% without significant valvular or pericardial disease and no ischemia on treadmill stress test. Workup exhibited no  evidence of acute coronary syndrome, and patient is stable for discharge. He will be discharged home on Holter monitor to investigate for any irregular rhythms for atypical chest pain and is to follow up with Dr. Bangura of cardiology in 2-3 weeks. He is also to follow up with his PCP to discuss the necessity of Adderrall as it can exacerbate chest pain.      Pertinent Physical Exam At Time of Discharge  Physical Exam  Constitutional:       Appearance: Normal appearance. He is not toxic-appearing.   HENT:      Head: Normocephalic and atraumatic.      Mouth/Throat:      Mouth: Mucous membranes are moist.      Pharynx: Oropharynx is clear.   Eyes:      Extraocular Movements: Extraocular movements intact.      Conjunctiva/sclera: Conjunctivae normal.   Cardiovascular:      Rate and Rhythm: Normal rate and regular rhythm.      Heart sounds: No murmur heard.     No gallop.   Pulmonary:      Effort: Pulmonary effort is normal. No respiratory distress.      Breath sounds: No wheezing, rhonchi or rales.   Abdominal:      Palpations: Abdomen is soft.   Skin:     General: Skin is warm and dry.   Neurological:      General: No focal deficit present.      Mental Status: He is alert and oriented to person, place, and time. Mental status is at baseline.         Outpatient Follow-Up  PCP follow up   Cardiology follow up       SHABBIR CHEATHAM IV     I saw this patient with the above medical student and performed my own History and Physical Examination.   My Subjective and Objective findings are reflected in the above documentation.  The Assessment and Plan reflect my input. My Edits are included in the above documentation.    Discussed with attending,  Livan Trivedi M.D. PGY-3  Internal Medicine

## 2024-09-12 NOTE — PROGRESS NOTES
09/12/24 1040   Discharge Planning   Living Arrangements Spouse/significant other   Support Systems Spouse/significant other   Assistance Needed none   Type of Residence Private residence   Home or Post Acute Services None   Expected Discharge Disposition Home   Does the patient need discharge transport arranged? No   Housing Stability   In the last 12 months, was there a time when you were not able to pay the mortgage or rent on time? N   At any time in the past 12 months, were you homeless or living in a shelter (including now)? N   Transportation Needs   In the past 12 months, has lack of transportation kept you from medical appointments or from getting medications? no   In the past 12 months, has lack of transportation kept you from meetings, work, or from getting things needed for daily living? No     Spoke to patient at bedside to explain my role in discharge planning. Patient states he lives at home with his wife. PCP is Dr Landers. Patient states he feels he effectively manages his health at home and plans to return home when medically ready.

## 2024-09-12 NOTE — HOSPITAL COURSE
Mr. Whitney is a 48 yo male who presented for chest pain associated with nausea/vomiting and diaphoresis. He has a past medical history of ADHD on Adderall, restless leg syndrome, GBS/Lyme disease, and depression. He does vape/smokes and eats marijuana daily. On presentation to the ED he was afebrile, normotensive, and bradycardic HR 53. CBC, troponin, and CMP were all within normal limits. CXR negative for acute cardiopulmonary processes. EKG showed normal sinus rhythm with bradycardia, rate 58, no ST segment changes or evidence of acute ischemia. He was admitted for further workup of chest pain. On admission he received an ASA load with 324 mg and cardiology was consulted. Hemoglobin A1c (5.5%) and lipid panel (total cholesterol 217, HDL 63.7, ) were ordered. ECHO on 9/12 showed EF 55% without significant valvular or pericardial disease and no ischemia on treadmill stress test. Workup exhibited no evidence of acute coronary syndrome, and patient is stable for discharge. He will be discharged home on Holter monitor to investigate for any irregular rhythms for atypical chest pain and is to follow up with Dr. Bangura of cardiology in 2-3 weeks. He is also to follow up with his PCP to discuss the necessity of Adderrall as it can exacerbate chest pain.

## 2024-09-12 NOTE — DISCHARGE INSTRUCTIONS
Please follow up with your primary care provider within 7 days for hospital follow up. Please call to make this appointment.  Please follow-up with cardiology (Dr. Bangura) within 2-3 weeks from discharge.  Please call to make appointment.  Phone number: [phone] . A referral was placed in the system for your follow-up.    You will be discharged home on Holter monitor to investigate any irregular heart activity that led to your atypical chest pain.  Results will be reviewed during your follow-up with your PCP and cardiologist.    Please take your medications as prescribed.   Please discuss with your PCP the indication for Adderall as it can exacerbate/trigger atypical chest pain and other associated symptoms.    You may try pantoprazole in the event that GI symptoms are contributing to pain    If you have any new or worsening symptoms seek medical attention.    Thank you for allowing us to participate in your care!    -[facility] [place] [facility].

## 2024-09-12 NOTE — CONSULTS
Inpatient consult to Cardiology  Consult performed by: Irving Bangura MD  Consult ordered by: Gage Renee DO  Reason for consult: unstable angina  Assessment/Recommendations:   IMPRESSIONS:  1. Atypical chest pain, no evidence of acute coronary syndrome  2.  Palpitation  3.  Hyperlipidemia  4.  No ischemia on treadmill stress test today at 10.1 METS  5.  Echo ejection fraction 55% without significant valvular or pericardial disease on study done today  6.  Anxiety  7.  ADHD on Adderall  8.  Status post gastric sleeve bariatric surgery 2016  9.  Gastroesophageal reflux disease  10.  Asthma  11.  History of Guillain-Barré syndrome  12.  History of restless legs disorder  13.  BMI 30.5    RECOMMENDATIONS:  1.  Acceptable for discharge cardiac wise  2.  Holter monitor will be placed today prior to discharge  3.  Outpatient follow-up after monitor study is complete.      History Of Present Illness:    David Whintey is a 49 y.o. male presenting with chest discomfort and palpitations.    This 49-year-old hyperlipidemic man with a BMI of 30.5 and a family history of premature CAD (maternal uncle and maternal grandfather developed CAD in their 60s) has never himself had any prior cardiac problems.  Around noon on the day of admission, he received a heart rate alarm on his smart watch with a heart rate of 135.  This occurred while he was sitting on the couch.  He went to lie down.  He subsequently developed discomfort in his left upper chest and shoulder that started around noon on the day of admission.  The symptoms persisted and did not seem to go away although they did wax and wane for several hours.  He developed symptoms of nausea and then vomited in the setting of which he is he started to become diaphoretic, and started hyperventilating.  He called 911 at 6:21 PM and was transported to the ER for further evaluation and care.  Initial EKG and cardiac markers were normal.    In retrospect he has had occasional  episodes of chest pain with physical stress such as moving heavy boxes around the house, mowing the lawn, and sometimes climbing stairs.  He experiences palpitations on a daily basis.  This feels like his heart is racing as it did at noon yesterday when he received an alert from his smart watch with a heart rate of 135.  He denies orthopnea, PND, syncope, and near syncope.    PMH: Asthma; status post bariatric surgery with gastric sleeve 2016; DM Barré syndrome; status postcholecystectomy; restless legs disorder; ADHD; gastroesophageal reflux disease; BMI = 30.5     Last Recorded Vitals:  Vitals:    09/11/24 2049 09/12/24 0028 09/12/24 0440 09/12/24 0710   BP: 117/71 119/59 114/71 105/57   BP Location: Left arm Left arm Left arm Right arm   Patient Position: Lying Lying Lying Lying   Pulse: 54 64 64 56   Resp: 16 16 16 18   Temp: 36.3 °C (97.3 °F) 36.2 °C (97.2 °F) 36.3 °C (97.3 °F) 36.5 °C (97.7 °F)   TempSrc: Temporal Temporal Temporal Temporal   SpO2: 99% 98% 98% 97%   Weight: 102 kg (225 lb)      Height: 1.829 m (6')          Last Labs:    Results from last 7 days   Lab Units 09/12/24  0514 09/11/24  1910   SODIUM mmol/L 139 137   POTASSIUM mmol/L 3.9 4.1   CHLORIDE mmol/L 105 103   CO2 mmol/L 26 27   BUN mg/dL 14 13   CREATININE mg/dL 1.05 1.13   CALCIUM mg/dL 9.0 9.1   PROTEIN TOTAL g/dL  --  7.5   BILIRUBIN TOTAL mg/dL  --  0.4   ALK PHOS U/L  --  68   ALT U/L  --  14   AST U/L  --  18   GLUCOSE mg/dL 91 92        Results for orders placed or performed during the hospital encounter of 09/11/24 (from the past 24 hour(s))   ECG 12 lead   Result Value Ref Range    Ventricular Rate 58 BPM    Atrial Rate 58 BPM    IA Interval 154 ms    QRS Duration 100 ms    QT Interval 436 ms    QTC Calculation(Bazett) 428 ms    P Axis 32 degrees    R Axis 19 degrees    T Axis 47 degrees    QRS Count 10 beats    Q Onset 223 ms    P Onset 146 ms    P Offset 198 ms    T Offset 441 ms    QTC Fredericia 430 ms   CBC and Auto  Differential   Result Value Ref Range    WBC 9.0 4.4 - 11.3 x10*3/uL    nRBC 0.0 0.0 - 0.0 /100 WBCs    RBC 4.56 4.50 - 5.90 x10*6/uL    Hemoglobin 13.5 13.5 - 17.5 g/dL    Hematocrit 40.0 (L) 41.0 - 52.0 %    MCV 88 80 - 100 fL    MCH 29.6 26.0 - 34.0 pg    MCHC 33.8 32.0 - 36.0 g/dL    RDW 12.8 11.5 - 14.5 %    Platelets 311 150 - 450 x10*3/uL    Neutrophils % 56.0 40.0 - 80.0 %    Immature Granulocytes %, Automated 0.6 0.0 - 0.9 %    Lymphocytes % 31.4 13.0 - 44.0 %    Monocytes % 9.4 2.0 - 10.0 %    Eosinophils % 1.7 0.0 - 6.0 %    Basophils % 0.9 0.0 - 2.0 %    Neutrophils Absolute 5.03 1.20 - 7.70 x10*3/uL    Immature Granulocytes Absolute, Automated 0.05 0.00 - 0.70 x10*3/uL    Lymphocytes Absolute 2.81 1.20 - 4.80 x10*3/uL    Monocytes Absolute 0.84 0.10 - 1.00 x10*3/uL    Eosinophils Absolute 0.15 0.00 - 0.70 x10*3/uL    Basophils Absolute 0.08 0.00 - 0.10 x10*3/uL   Comprehensive Metabolic Panel   Result Value Ref Range    Glucose 92 74 - 99 mg/dL    Sodium 137 136 - 145 mmol/L    Potassium 4.1 3.5 - 5.3 mmol/L    Chloride 103 98 - 107 mmol/L    Bicarbonate 27 21 - 32 mmol/L    Anion Gap 11 10 - 20 mmol/L    Urea Nitrogen 13 6 - 23 mg/dL    Creatinine 1.13 0.50 - 1.30 mg/dL    eGFR 79 >60 mL/min/1.73m*2    Calcium 9.1 8.6 - 10.3 mg/dL    Albumin 4.2 3.4 - 5.0 g/dL    Alkaline Phosphatase 68 33 - 120 U/L    Total Protein 7.5 6.4 - 8.2 g/dL    AST 18 9 - 39 U/L    Bilirubin, Total 0.4 0.0 - 1.2 mg/dL    ALT 14 10 - 52 U/L   Troponin I, High Sensitivity, Initial   Result Value Ref Range    Troponin I, High Sensitivity 4 0 - 20 ng/L   Light Blue Top   Result Value Ref Range    Extra Tube Hold for add-ons.    SST TOP   Result Value Ref Range    Extra Tube Hold for add-ons.    Coagulation Screen   Result Value Ref Range    Protime 12.8 9.8 - 12.8 seconds    INR 1.1 0.9 - 1.1    aPTT 27 27 - 38 seconds   Hemoglobin A1c   Result Value Ref Range    Hemoglobin A1C 5.3 see below %    Estimated Average Glucose 105 Not  Established mg/dL   Magnesium   Result Value Ref Range    Magnesium 2.02 1.60 - 2.40 mg/dL   Troponin, High Sensitivity, 1 Hour   Result Value Ref Range    Troponin I, High Sensitivity 3 0 - 20 ng/L   Lipid Panel   Result Value Ref Range    Cholesterol 217 (H) 0 - 199 mg/dL    HDL-Cholesterol 63.7 mg/dL    Cholesterol/HDL Ratio 3.4     LDL Calculated 135 (H) <=99 mg/dL    VLDL 18 0 - 40 mg/dL    Triglycerides 92 0 - 149 mg/dL    Non HDL Cholesterol 153 (H) 0 - 149 mg/dL   Renal function panel   Result Value Ref Range    Glucose 91 74 - 99 mg/dL    Sodium 139 136 - 145 mmol/L    Potassium 3.9 3.5 - 5.3 mmol/L    Chloride 105 98 - 107 mmol/L    Bicarbonate 26 21 - 32 mmol/L    Anion Gap 12 10 - 20 mmol/L    Urea Nitrogen 14 6 - 23 mg/dL    Creatinine 1.05 0.50 - 1.30 mg/dL    eGFR 86 >60 mL/min/1.73m*2    Calcium 9.0 8.6 - 10.3 mg/dL    Phosphorus 4.0 2.5 - 4.9 mg/dL    Albumin 3.7 3.4 - 5.0 g/dL   Magnesium   Result Value Ref Range    Magnesium 2.04 1.60 - 2.40 mg/dL   CBC   Result Value Ref Range    WBC 8.1 4.4 - 11.3 x10*3/uL    nRBC 0.0 0.0 - 0.0 /100 WBCs    RBC 4.34 (L) 4.50 - 5.90 x10*6/uL    Hemoglobin 12.8 (L) 13.5 - 17.5 g/dL    Hematocrit 39.4 (L) 41.0 - 52.0 %    MCV 91 80 - 100 fL    MCH 29.5 26.0 - 34.0 pg    MCHC 32.5 32.0 - 36.0 g/dL    RDW 12.9 11.5 - 14.5 %    Platelets 254 150 - 450 x10*3/uL         PTT - 9/11/2024:  7:10 PM  1.1   12.8 27     Troponin I, High Sensitivity   Date/Time Value Ref Range Status   09/11/2024 08:10 PM 3 0 - 20 ng/L Final   09/11/2024 07:10 PM 4 0 - 20 ng/L Final   06/20/2024 08:32 PM 5 0 - 20 ng/L Final     Hemoglobin A1C   Date/Time Value Ref Range Status   09/11/2024 07:10 PM 5.3 see below % Final     LDL Calculated   Date/Time Value Ref Range Status   09/11/2024 08:10  (H) <=99 mg/dL Final     Comment:                                 Near   Borderline      AGE      Desirable  Optimal    High     High     Very High     0-19 Y     0 - 109     ---    110-129   >/=  130     ----    20-24 Y     0 - 119     ---    120-159   >/= 160     ----      >24 Y     0 -  99   100-129  130-159   160-189     >/=190       VLDL   Date/Time Value Ref Range Status   09/11/2024 08:10 PM 18 0 - 40 mg/dL Final      Last I/O:  No intake/output data recorded.    Past Medical History:  He has a past medical history of ADD (attention deficit disorder), Depression, and Restless leg syndrome.    Past Surgical History:  He has a past surgical history that includes Gastric bypass (2016).      Social History:  He reports that he has never smoked. He has never used smokeless tobacco. He reports that he does not currently use alcohol. He reports current drug use. Drug: Marijuana.    Family History:  Family History   Problem Relation Name Age of Onset    Stroke Mother          Review of Systems:  The patient contracted COVID August 20, 2024; positive for nausea, vomiting, diarrhea, and cough.  Patient denies fevers, chills,  constipation, hematuria, dysuria, abdominal pain,  red blood per rectum; all other review of systems is negative.    Allergies:  Compazine [prochlorperazine]    Inpatient Medications:  Scheduled medications   Medication Dose Route Frequency    aspirin  81 mg oral Daily    cetirizine  10 mg oral Daily    enoxaparin  40 mg subcutaneous q24h    gabapentin  600 mg oral TID    pantoprazole  40 mg intravenous Daily    perflutren lipid microspheres  0.5-10 mL of dilution intravenous Once in imaging    polyethylene glycol  17 g oral Daily    regadenoson  0.4 mg intravenous Once in imaging    rOPINIRole  1 mg oral BID    sertraline  100 mg oral Daily     PRN medications   Medication    aminophylline     Continuous Medications   Medication Dose Last Rate     Outpatient Medications:  Current Outpatient Medications   Medication Instructions    amphetamine-dextroamphetamine XR (Adderall XR) 25 mg 24 hr capsule 25 mg, oral, Every morning, Do not crush or chew.    gabapentin (NEURONTIN) 600 mg, oral, 3  times daily    loratadine (CLARITIN) 10 mg, oral, Daily    rOPINIRole (REQUIP) 1 mg, oral, 2 times daily    sertraline (ZOLOFT) 100 mg, oral, Daily       Current Imaging  ECG 12 lead    Result Date: 9/12/2024  Sinus bradycardia Incomplete right bundle branch block Borderline ECG When compared with ECG of 20-JUN-2024 20:27, No significant change was found    XR chest 1 view    Result Date: 9/11/2024  STUDY: Chest Radiograph;  9/11/2024 7:29PM INDICATION: Chest pain. COMPARISON: None available. ACCESSION NUMBER(S): [id number] ORDERING CLINICIAN: BERNADETTE VELARDE TECHNIQUE:  Frontal chest was obtained at 19:29 hours. FINDINGS: CARDIOMEDIASTINAL SILHOUETTE: Cardiomediastinal silhouette is normal in size and configuration.  LUNGS: Lungs are clear.  There is no pleural effusion and no evidence of pneumothorax.  ABDOMEN: No remarkable upper abdominal findings.  BONES: No acute osseous changes.    No acute cardiopulmonary disease. Signed by Rosendo Rosenthal MD       Physical Exam:  GENERAL:  pleasant 49 year-old  HEENT: No xanthelasma  NECK: Supple, no palpable adenopathy or thyromegaly  CHEST: Clear to auscultation, respiratory effort unlabored  CARDIAC: RRR, normal S1 and S2, no audible murmur, rub, gallop, carotids are brisk, PMI is not displaced  ABD: Active bowel sounds, nontender, no organomegaly, no evidence of ascites  EXT: No clubbing, cyanosis, edema, or tenderness  NEURO: Awake, alert, appropriate, speech is fluent       Assessment/Plan   1. Atypical chest pain, no evidence of acute coronary syndrome  2.  Palpitation  3.  Hyperlipidemia  4.  No ischemia on treadmill stress test today at 10.1 METS  5.  Echo ejection fraction 55% without significant valvular or pericardial disease on study done today  6.  Anxiety  7.  ADHD on Adderall  8.  Status post gastric sleeve bariatric surgery 2016  9.  Gastroesophageal reflux disease  10.  Asthma  11.  History of Guillain-Barré syndrome  12.  History of restless legs  disorder  13.  BMI 30.5    RECOMMENDATIONS:  1.  Acceptable for discharge cardiac wise  2.  Holter monitor will be placed today prior to discharge  3.  Outpatient follow-up after monitor study is complete.    Code Status:  Full Code      Irving Bangura MD

## 2024-09-12 NOTE — CARE PLAN
The patient's goals for the shift include free from chest pain or discomfort    The clinical goals for the shift include remain hemodynamically stable        Problem: Pain - Adult  Goal: Verbalizes/displays adequate comfort level or baseline comfort level  9/12/2024 1306 by Libertad Monroe RN  Outcome: Adequate for Discharge  9/12/2024 0816 by Libertad Monroe RN  Outcome: Progressing     Problem: Safety - Adult  Goal: Free from fall injury  9/12/2024 1306 by Libertad Monroe RN  Outcome: Adequate for Discharge  9/12/2024 0816 by Libertad Monroe RN  Outcome: Progressing     Problem: Discharge Planning  Goal: Discharge to home or other facility with appropriate resources  9/12/2024 1306 by Libertad Monroe RN  Outcome: Adequate for Discharge  9/12/2024 0816 by Libertad Monroe RN  Outcome: Progressing     Problem: Chronic Conditions and Co-morbidities  Goal: Patient's chronic conditions and co-morbidity symptoms are monitored and maintained or improved  9/12/2024 1306 by Libertad Monroe RN  Outcome: Adequate for Discharge  9/12/2024 0816 by Libertad Monroe RN  Outcome: Progressing     Problem: Fall/Injury  Goal: Not fall by end of shift  9/12/2024 1306 by Libertad Monroe RN  Outcome: Adequate for Discharge  9/12/2024 0816 by Libertad Monroe RN  Outcome: Progressing  Goal: Be free from injury by end of the shift  9/12/2024 1306 by Libertad Monroe RN  Outcome: Adequate for Discharge  9/12/2024 0816 by Libertad Monroe RN  Outcome: Progressing  Goal: Verbalize understanding of personal risk factors for fall in the hospital  9/12/2024 1306 by Libertad Monroe RN  Outcome: Adequate for Discharge  9/12/2024 0816 by Libertad Monroe RN  Outcome: Progressing  Goal: Verbalize understanding of risk factor reduction measures to prevent injury from fall in the home  9/12/2024 1306 by Libertad Monroe RN  Outcome: Adequate for Discharge  9/12/2024 0816 by Libertad Monroe RN  Outcome: Progressing  Goal: Use assistive  devices by end of the shift  9/12/2024 1306 by Libertad Monroe RN  Outcome: Adequate for Discharge  9/12/2024 0816 by Libertad Monroe RN  Outcome: Progressing  Goal: Pace activities to prevent fatigue by end of the shift  9/12/2024 1306 by Libertad Monroe RN  Outcome: Adequate for Discharge  9/12/2024 0816 by Libertad Monroe RN  Outcome: Progressing     Problem: ACS/CP/NSTEMI/STEMI  Goal: Chest pain managed (free from pain or at acceptable level)  9/12/2024 1306 by Libertad Monroe RN  Outcome: Adequate for Discharge  9/12/2024 0816 by Libertad Monroe RN  Outcome: Progressing  Goal: Lab values return to normal range  9/12/2024 1306 by Libertad Monroe RN  Outcome: Adequate for Discharge  9/12/2024 0816 by Libertad Monroe RN  Outcome: Progressing  Goal: Promote self management  9/12/2024 1306 by Libertad Monroe RN  Outcome: Adequate for Discharge  9/12/2024 0816 by Libertad Monroe RN  Outcome: Progressing  Goal: Serial ECG will return to baseline  9/12/2024 1306 by Libertad Monroe RN  Outcome: Adequate for Discharge  9/12/2024 0816 by Libertad Monroe RN  Outcome: Progressing  Goal: Verbalize understanding of procedures/devices  9/12/2024 1306 by Libertad Monroe RN  Outcome: Adequate for Discharge  9/12/2024 0816 by Libertad Monroe RN  Outcome: Progressing  Goal: Wean vasopressors/achieve hemodynamic stability  9/12/2024 1306 by Libertad Monroe RN  Outcome: Adequate for Discharge  9/12/2024 0816 by Libertad Monroe RN  Outcome: Progressing     Problem: Pain  Goal: Takes deep breaths with improved pain control throughout the shift  9/12/2024 1306 by Libertad Monroe RN  Outcome: Adequate for Discharge  9/12/2024 0816 by Libertad Monroe RN  Outcome: Progressing  Goal: Turns in bed with improved pain control throughout the shift  9/12/2024 1306 by Libertad Monroe RN  Outcome: Adequate for Discharge  9/12/2024 0816 by Libertad Monroe RN  Outcome: Progressing  Goal: Walks with improved pain control  throughout the shift  9/12/2024 1306 by Libertad Monroe RN  Outcome: Adequate for Discharge  9/12/2024 0816 by Libertad Monroe RN  Outcome: Progressing  Goal: Performs ADL's with improved pain control throughout shift  9/12/2024 1306 by Libertad Monroe RN  Outcome: Adequate for Discharge  9/12/2024 0816 by Libertad Monroe RN  Outcome: Progressing  Goal: Participates in PT with improved pain control throughout the shift  9/12/2024 1306 by Libertad Monroe RN  Outcome: Adequate for Discharge  9/12/2024 0816 by Libertad Monroe RN  Outcome: Progressing  Goal: Free from opioid side effects throughout the shift  9/12/2024 1306 by Libertad Monroe RN  Outcome: Adequate for Discharge  9/12/2024 0816 by Libertad Monroe RN  Outcome: Progressing  Goal: Free from acute confusion related to pain meds throughout the shift  9/12/2024 1306 by Libertad Monroe RN  Outcome: Adequate for Discharge  9/12/2024 0816 by Libertad Monroe RN  Outcome: Progressing

## 2024-09-12 NOTE — NURSING NOTE
Iv  removed.  Pt.  Had  stress test  today.  He  was  seen  by  dr. Bangura  and  halter  monitor  was  placed.  Pt.  Was  given  discharge  instructions  with understanding  and discharged to home.

## 2024-09-12 NOTE — CARE PLAN
Problem: Pain - Adult  Goal: Verbalizes/displays adequate comfort level or baseline comfort level  Outcome: Progressing     Problem: Safety - Adult  Goal: Free from fall injury  Outcome: Progressing     Problem: Discharge Planning  Goal: Discharge to home or other facility with appropriate resources  Outcome: Progressing     Problem: Chronic Conditions and Co-morbidities  Goal: Patient's chronic conditions and co-morbidity symptoms are monitored and maintained or improved  Outcome: Progressing     Problem: Fall/Injury  Goal: Not fall by end of shift  Outcome: Progressing  Goal: Be free from injury by end of the shift  Outcome: Progressing  Goal: Verbalize understanding of personal risk factors for fall in the hospital  Outcome: Progressing  Goal: Verbalize understanding of risk factor reduction measures to prevent injury from fall in the home  Outcome: Progressing  Goal: Use assistive devices by end of the shift  Outcome: Progressing  Goal: Pace activities to prevent fatigue by end of the shift  Outcome: Progressing     Problem: ACS/CP/NSTEMI/STEMI  Goal: Chest pain managed (free from pain or at acceptable level)  Outcome: Progressing  Goal: Lab values return to normal range  Outcome: Progressing  Goal: Promote self management  Outcome: Progressing  Goal: Serial ECG will return to baseline  Outcome: Progressing  Goal: Verbalize understanding of procedures/devices  Outcome: Progressing  Goal: Wean vasopressors/achieve hemodynamic stability  Outcome: Progressing     Problem: Pain  Goal: Takes deep breaths with improved pain control throughout the shift  Outcome: Progressing  Goal: Turns in bed with improved pain control throughout the shift  Outcome: Progressing  Goal: Walks with improved pain control throughout the shift  Outcome: Progressing  Goal: Performs ADL's with improved pain control throughout shift  Outcome: Progressing  Goal: Participates in PT with improved pain control throughout the shift  Outcome:  Progressing  Goal: Free from opioid side effects throughout the shift  Outcome: Progressing  Goal: Free from acute confusion related to pain meds throughout the shift  Outcome: Progressing   The patient's goals for the shift include  free from chest pain or pressure    The clinical goals for the shift include  remain hemodynamically stable.

## 2024-09-12 NOTE — CARE PLAN
The patient's goals for the shift include free from chest pain or discomfort    The clinical goals for the shift include remain hemodynamically stable      Problem: Pain - Adult  Goal: Verbalizes/displays adequate comfort level or baseline comfort level  Outcome: Progressing     Problem: Safety - Adult  Goal: Free from fall injury  Outcome: Progressing     Problem: Discharge Planning  Goal: Discharge to home or other facility with appropriate resources  Outcome: Progressing     Problem: Chronic Conditions and Co-morbidities  Goal: Patient's chronic conditions and co-morbidity symptoms are monitored and maintained or improved  Outcome: Progressing     Problem: Fall/Injury  Goal: Not fall by end of shift  Outcome: Progressing  Goal: Be free from injury by end of the shift  Outcome: Progressing  Goal: Verbalize understanding of personal risk factors for fall in the hospital  Outcome: Progressing  Goal: Verbalize understanding of risk factor reduction measures to prevent injury from fall in the home  Outcome: Progressing  Goal: Use assistive devices by end of the shift  Outcome: Progressing  Goal: Pace activities to prevent fatigue by end of the shift  Outcome: Progressing     Problem: ACS/CP/NSTEMI/STEMI  Goal: Chest pain managed (free from pain or at acceptable level)  Outcome: Progressing  Goal: Lab values return to normal range  Outcome: Progressing  Goal: Promote self management  Outcome: Progressing  Goal: Serial ECG will return to baseline  Outcome: Progressing  Goal: Verbalize understanding of procedures/devices  Outcome: Progressing  Goal: Wean vasopressors/achieve hemodynamic stability  Outcome: Progressing     Problem: Pain  Goal: Takes deep breaths with improved pain control throughout the shift  Outcome: Progressing  Goal: Turns in bed with improved pain control throughout the shift  Outcome: Progressing  Goal: Walks with improved pain control throughout the shift  Outcome: Progressing  Goal: Performs ADL's  with improved pain control throughout shift  Outcome: Progressing  Goal: Participates in PT with improved pain control throughout the shift  Outcome: Progressing  Goal: Free from opioid side effects throughout the shift  Outcome: Progressing  Goal: Free from acute confusion related to pain meds throughout the shift  Outcome: Progressing

## 2024-09-12 NOTE — H&P
"History Of Present Illness  David Whitney is a 49 y.o. male presenting with chest pain.  His past medical history is significant for ADHD on Adderall, restless leg syndrome, GBS/Lyme disease and depression presenting for chest pain at rest today.  Patient reports he has had several month history of chest pain that has waxed and waned but has always been with some form of exertional activity.  However today during lunchtime while he was sitting down he experienced palpitations, noting his heart rate was 135 on his Apple Watch, chest pain and felt nauseated.  He laid down at home and felt a little bit better.  Then again at dinnertime when he was seated he experienced an episode of sharp left sternal chest pain radiating up into the shoulder and neck.  Also experienced racing heart sensations up into his neck, nausea, dry heaving, shortness of breath and dizziness.  The pain weaned after about 10 to 15 minutes.  On the ride to the ED he endorses acute diaphoresis \"drenched in sweat\" as well as more nausea and dry heaving.    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  Last primary care visit was about a year ago.  Only ever saw a cardiologist prior to his gastric sleeve surgery for cardiac clearance.  There were no problems at that time.    Social history: Vapes/smokes/eats marijuana daily, No tobacco use, Denies alcohol, Denies other drug use    Family history: Mother with stroke at age 59, does not know paternal side of the family  Daily Meds: Gabapentin, ropinirole, sertraline, omeprazole, Adderall    ED course:  Vital signs: /74 HR 53 RR 18 SpO2 95 temp 97  Labs: CBC, troponin, CMP all within normal limits, CXR negative  EKG: Sinus bradycardia, rate 58 normal " intervals, no ST segment changes or acute ischemia, Qtc 428     Past Medical History  He has a past medical history of ADD (attention deficit disorder), Depression, and Restless leg syndrome.    Surgical History  He has a past surgical history that includes Gastric bypass (2016).     Social History  He reports that he has never smoked. He has never used smokeless tobacco. He reports that he does not currently use alcohol. He reports current drug use. Drug: Marijuana.    Family History  Family History   Problem Relation Name Age of Onset    Stroke Mother          Allergies  Compazine [prochlorperazine]    Review of Systems   Constitutional:  Positive for diaphoresis.   Respiratory:  Positive for shortness of breath.    Cardiovascular:  Positive for chest pain and palpitations. Negative for leg swelling.   Gastrointestinal:  Positive for nausea. Negative for abdominal pain.   Neurological:  Positive for dizziness. Negative for syncope, weakness, numbness and headaches.        Physical Exam  Constitutional:       General: He is not in acute distress.     Appearance: Normal appearance.   Eyes:      Extraocular Movements: Extraocular movements intact.      Conjunctiva/sclera: Conjunctivae normal.   Neck:      Vascular: No carotid bruit.   Cardiovascular:      Rate and Rhythm: Regular rhythm. Bradycardia present.      Pulses: Normal pulses.      Heart sounds: No murmur heard.  Pulmonary:      Effort: Pulmonary effort is normal. No respiratory distress.      Breath sounds: Normal breath sounds.   Abdominal:      General: Abdomen is flat. Bowel sounds are normal. There is no distension.      Palpations: Abdomen is soft.      Tenderness: There is no abdominal tenderness.   Musculoskeletal:         General: Normal range of motion.      Cervical back: Normal range of motion and neck supple.      Right lower leg: No edema.      Left lower leg: No edema.   Skin:     General: Skin is warm and dry.      Capillary Refill: Capillary  refill takes less than 2 seconds.   Neurological:      General: No focal deficit present.      Mental Status: He is alert.          Last Recorded Vitals  /74 (BP Location: Left arm, Patient Position: Lying)   Pulse 58   Temp 36.1 °C (97 °F) (Temporal)   Resp 15   Wt 102 kg (225 lb)   SpO2 (!) 92%     Relevant Results  Scheduled medications  aspirin, 324 mg, oral, Once  cetirizine, 10 mg, oral, Daily  enoxaparin, 40 mg, subcutaneous, q24h  gabapentin, 600 mg, oral, TID  perflutren lipid microspheres, 0.5-10 mL of dilution, intravenous, Once in imaging  polyethylene glycol, 17 g, oral, Daily  regadenoson, 0.4 mg, intravenous, Once in imaging  rOPINIRole, 1 mg, oral, BID  [START ON 9/12/2024] sertraline, 100 mg, oral, Daily      Continuous medications     PRN medications  PRN medications: aminophylline  Results for orders placed or performed during the hospital encounter of 09/11/24 (from the past 24 hour(s))   CBC and Auto Differential   Result Value Ref Range    WBC 9.0 4.4 - 11.3 x10*3/uL    nRBC 0.0 0.0 - 0.0 /100 WBCs    RBC 4.56 4.50 - 5.90 x10*6/uL    Hemoglobin 13.5 13.5 - 17.5 g/dL    Hematocrit 40.0 (L) 41.0 - 52.0 %    MCV 88 80 - 100 fL    MCH 29.6 26.0 - 34.0 pg    MCHC 33.8 32.0 - 36.0 g/dL    RDW 12.8 11.5 - 14.5 %    Platelets 311 150 - 450 x10*3/uL    Neutrophils % 56.0 40.0 - 80.0 %    Immature Granulocytes %, Automated 0.6 0.0 - 0.9 %    Lymphocytes % 31.4 13.0 - 44.0 %    Monocytes % 9.4 2.0 - 10.0 %    Eosinophils % 1.7 0.0 - 6.0 %    Basophils % 0.9 0.0 - 2.0 %    Neutrophils Absolute 5.03 1.20 - 7.70 x10*3/uL    Immature Granulocytes Absolute, Automated 0.05 0.00 - 0.70 x10*3/uL    Lymphocytes Absolute 2.81 1.20 - 4.80 x10*3/uL    Monocytes Absolute 0.84 0.10 - 1.00 x10*3/uL    Eosinophils Absolute 0.15 0.00 - 0.70 x10*3/uL    Basophils Absolute 0.08 0.00 - 0.10 x10*3/uL   Comprehensive Metabolic Panel   Result Value Ref Range    Glucose 92 74 - 99 mg/dL    Sodium 137 136 - 145 mmol/L     Potassium 4.1 3.5 - 5.3 mmol/L    Chloride 103 98 - 107 mmol/L    Bicarbonate 27 21 - 32 mmol/L    Anion Gap 11 10 - 20 mmol/L    Urea Nitrogen 13 6 - 23 mg/dL    Creatinine 1.13 0.50 - 1.30 mg/dL    eGFR 79 >60 mL/min/1.73m*2    Calcium 9.1 8.6 - 10.3 mg/dL    Albumin 4.2 3.4 - 5.0 g/dL    Alkaline Phosphatase 68 33 - 120 U/L    Total Protein 7.5 6.4 - 8.2 g/dL    AST 18 9 - 39 U/L    Bilirubin, Total 0.4 0.0 - 1.2 mg/dL    ALT 14 10 - 52 U/L   Troponin I, High Sensitivity, Initial   Result Value Ref Range    Troponin I, High Sensitivity 4 0 - 20 ng/L   Coagulation Screen   Result Value Ref Range    Protime 12.8 9.8 - 12.8 seconds    INR 1.1 0.9 - 1.1    aPTT 27 27 - 38 seconds   Magnesium   Result Value Ref Range    Magnesium 2.02 1.60 - 2.40 mg/dL   Troponin, High Sensitivity, 1 Hour   Result Value Ref Range    Troponin I, High Sensitivity 3 0 - 20 ng/L     XR chest 1 view    Result Date: 9/11/2024  STUDY: Chest Radiograph;  9/11/2024 7:29PM INDICATION: Chest pain. COMPARISON: None available. ACCESSION NUMBER(S): [id number] ORDERING CLINICIAN: BERNADETTE VELARDE TECHNIQUE:  Frontal chest was obtained at 19:29 hours. FINDINGS: CARDIOMEDIASTINAL SILHOUETTE: Cardiomediastinal silhouette is normal in size and configuration.  LUNGS: Lungs are clear.  There is no pleural effusion and no evidence of pneumothorax.  ABDOMEN: No remarkable upper abdominal findings.  BONES: No acute osseous changes.    No acute cardiopulmonary disease. Signed by Rosendo Rosenthal MD        Assessment/Plan   Assessment & Plan  Chest pain at rest    Chest pain on exertion    This is a 49-year-old male with Diana history of ADD, depression, anxiety, restless leg syndrome, gastric sleeve surgery presenting for acute unstable angina.  Symptoms started this afternoon went away then recurred again prompting him to come to the ED.  Workup in the ED did not reveal any significant cardiac abnormalities, troponins negative 4 on presentation downtrended  to 3, EKG without ischemia or significant findings.  All labs normal.  Patient is admitted for further workup of these cardiac symptoms given his concerning story on presentation.  Will plan for stress test and echo and cardiology consult tomorrow.    #Unstable angina  #History of stable angina  The patient is hemodynamically stable and asymptomatic at this time.  He does endorse acute on chronic stressors in his life as well as history of Adderall use daily.  Suspect this may be related to his increased stressors and anxiety in his life or related to reflux from his gastric sleeve history though he denies any symptoms he is on omeprazole.  Cardiac workup thus far has been negative out of an abundance of caution we will admit the patient for observation and obtain a stress test and echo as well as consult cardiology.  Plan:  -Trops were trended and went from 4->3, no evidence of cardiac ischemia at this time  - Loaded the patient with 324 mg aspirin, will start aspirin 81 mg daily pending cardiology consultation  - Hemoglobin A1c and lipid panel ordered for risk stratification  - Nuclear stress test and echocardiogram ordered  - Cardiology consult, appreciate recs  - Cardiac diet  -Telemetry    #Restless leg syndrome  #ADD  #Anxiety and depression  -Ordered his home gabapentin, ropinirole, sertraline  -Holding Adderall or other stimulants at this time and recommend he not resume these on discharge    Patient seen and discussed with senior resident Dr. Felipe, to be seen and staffed with an attending physician.    Josephine Dooley,   Family Medicine, PGY-1    Senior addendum:    49-year-old male presenting with complaint of chest pain at rest concerning for unstable angina noting he has a history of chest pain with exertion noting review of records shows he was seen by primary care provider 1/2024 for similar complaint diagnosed with musculoskeletal pain recommendations of weight loss diet modifications  low-salt and follow-up in 1 month but was lost to follow-up.  Labs were grossly unremarkable with no elevation in troponin, EKG grossly unremarkable with no evidence of ischemic injury pattern.  En route per EMS patient aspirin loaded 325 mg, chest pain resolved upon interview in the ED.  Medical history significant for gastric banding with intermittent episodes of gastric reflux treated with over-the-counter PPIs.  Pertinent recent history significant emotional stressors secondary to job loss/selling of home and chronic use of stimulants Adderall for known history of ADD. Physical exam reveals an adult male in no apparent distress appearing stated age, supple neck, cardiac exam bradycardic regular rhythm, lungs clear to auscultation with no added work of breathing, abdomen soft nontender active bowel tones, musculoskeletal exam with no significant edema moves all extremities x 4, appropriate mood and behavior and no focal neurologic deficits.   Admitted for ACS rule out given elevated heart score we will proceed with plan of cardiac stress test, echocardiogram, restratification hemoglobin A1c and lipid panel will continue with 81 mg of aspirin pending results of restratification may initiate statin therapy as part of a lipid-lowering regimen.  Cardiology consulted recommendations pending.  No stated hx of HTN, or HLD. Anticipate patient be discharged home without needs and instructions to follow-up with his primary care provider.    Patient seen and examined independently of intern physician. The above documentation was reviewed by me and reflects my direct input    Mark Felipe,   PGY-3, Internal Medicine [facility]

## 2024-09-13 LAB
ATRIAL RATE: 57 BPM
ATRIAL RATE: 58 BPM
P AXIS: 26 DEGREES
P AXIS: 32 DEGREES
P OFFSET: 198 MS
P OFFSET: 198 MS
P ONSET: 146 MS
P ONSET: 146 MS
PR INTERVAL: 152 MS
PR INTERVAL: 154 MS
Q ONSET: 222 MS
Q ONSET: 223 MS
QRS COUNT: 10 BEATS
QRS COUNT: 10 BEATS
QRS DURATION: 100 MS
QRS DURATION: 98 MS
QT INTERVAL: 436 MS
QT INTERVAL: 444 MS
QTC CALCULATION(BAZETT): 428 MS
QTC CALCULATION(BAZETT): 432 MS
QTC FREDERICIA: 430 MS
QTC FREDERICIA: 436 MS
R AXIS: 19 DEGREES
R AXIS: 19 DEGREES
T AXIS: 47 DEGREES
T AXIS: 47 DEGREES
T OFFSET: 441 MS
T OFFSET: 444 MS
VENTRICULAR RATE: 57 BPM
VENTRICULAR RATE: 58 BPM

## 2024-09-16 LAB
ATRIAL RATE: 57 BPM
P AXIS: 26 DEGREES
P OFFSET: 198 MS
P ONSET: 146 MS
PR INTERVAL: 152 MS
Q ONSET: 222 MS
QRS COUNT: 10 BEATS
QRS DURATION: 98 MS
QT INTERVAL: 444 MS
QTC CALCULATION(BAZETT): 432 MS
QTC FREDERICIA: 436 MS
R AXIS: 19 DEGREES
T AXIS: 47 DEGREES
T OFFSET: 444 MS
VENTRICULAR RATE: 57 BPM

## 2024-09-30 ENCOUNTER — APPOINTMENT (OUTPATIENT)
Dept: CARDIOLOGY | Facility: CLINIC | Age: 49
End: 2024-09-30
Payer: COMMERCIAL

## 2024-09-30 VITALS
HEIGHT: 72 IN | DIASTOLIC BLOOD PRESSURE: 82 MMHG | SYSTOLIC BLOOD PRESSURE: 128 MMHG | BODY MASS INDEX: 31.15 KG/M2 | HEART RATE: 72 BPM | WEIGHT: 230 LBS

## 2024-09-30 DIAGNOSIS — R07.2 PRECORDIAL PAIN: Primary | ICD-10-CM

## 2024-09-30 DIAGNOSIS — R00.2 PALPITATIONS: ICD-10-CM

## 2024-09-30 PROCEDURE — 99214 OFFICE O/P EST MOD 30 MIN: CPT | Performed by: INTERNAL MEDICINE

## 2024-09-30 PROCEDURE — 3008F BODY MASS INDEX DOCD: CPT | Performed by: INTERNAL MEDICINE

## 2024-09-30 PROCEDURE — 1036F TOBACCO NON-USER: CPT | Performed by: INTERNAL MEDICINE

## 2024-09-30 NOTE — ASSESSMENT & PLAN NOTE
Risk factor modification: educational materials were provided to the patient.   Risk factor modification: educational materials were provided to the patient.

## 2024-09-30 NOTE — ASSESSMENT & PLAN NOTE
Atypical chest pain with normal stress test at greater than 10 METS, September 2024: I reassured the patient.

## 2024-09-30 NOTE — ASSESSMENT & PLAN NOTE
Monitor study was turned in 2 days ago, but no data is available for me to review.  I will contact the patient upon review of testing and discuss next steps as needed.  I advised him to contact me should he not hear back from me within 2 weeks.    It is probable that his Adderall for ADHD is contributing to his symptoms.  I advised him to speak with the prescribing provider as to whether alternatives to this medication are available.

## 2024-09-30 NOTE — PATIENT INSTRUCTIONS

## 2024-09-30 NOTE — PROGRESS NOTES
"Chief Complaint:   Please see below.     History Of Present Illness:    David Whitney \"Dominick\" is a 49 y.o. male presenting with palpitations, dhest discomfort.    This 49-year-old man with prior ADHD on Adderall, gastric sleeve bariatric surgery, hyperlipidemia, anxiety, and a BMI of 31.2 was in observation at [facility] in mid September with symptoms of palpitations and chest discomfort.  Please see my prior note for complete details.  He ruled out for acute coronary syndrome, had an inpatient stress test that was negative for ischemia at 10.1 METS, had an echocardiogram with an ejection fraction of 55% without significant valvular or pericardial disease, and was subsequently discharged.  A monitor study was placed at the time of discharge.  He turned it in only 2 days ago, and no results are currently available.    He had brief episodes of chestt discomfort for the first few days afeter discharge, but then everything normalized.  The patient denies dyspnea, palpitations, orthopnea, PND, syncope, and near syncope.    He does not have MAU, but is not sleeping well.      He rarely consumes alcohol, about once a month.  He has three cups of coffee per day.         Last Recorded Vitals:  Vitals:    09/30/24 0900   BP: 128/82   BP Location: Left arm   Patient Position: Sitting   Pulse: 72   Weight: 104 kg (230 lb)   Height: 1.829 m (6')       Past Medical History:  He has a past medical history of ADD (attention deficit disorder), Depression, and Restless leg syndrome.    Past Surgical History:  He has a past surgical history that includes Gastric bypass (2016).      Social History:  He reports that he has never smoked. He has never used smokeless tobacco. He reports that he does not currently use alcohol. He reports current drug use. Drug: Marijuana.    Family History:  Family History   Problem Relation Name Age of Onset    Stroke Mother      Heart disease Mother's Sister      Heart disease Mother's " Brother      Heart attack Mother's Brother      Other (heart valve issues) Mother's Brother      Other (cardiac stent) Mother's Brother          Allergies:  Compazine [prochlorperazine]    Outpatient Medications:  Current Outpatient Medications   Medication Instructions    amphetamine-dextroamphetamine XR (Adderall XR) 25 mg 24 hr capsule 25 mg, oral, Every morning, Do not crush or chew.    gabapentin (NEURONTIN) 600 mg, oral, 3 times daily    loratadine (CLARITIN) 10 mg, oral, Daily    pantoprazole (PROTONIX) 40 mg, oral, Daily (0630), Do not crush, chew, or split.    rOPINIRole (REQUIP) 1 mg, oral, 2 times daily    sertraline (ZOLOFT) 100 mg, oral, Daily       Physical Exam:  GENERAL:  pleasant 49 year-old  HEENT: No xanthelasma  NECK: Supple, no palpable adenopathy or thyromegaly  CHEST: Clear to auscultation, respiratory effort unlabored  CARDIAC: RRR, normal S1 and S2, no audible murmur, rub, gallop, carotids are brisk, PMI is not displaced  ABD: Active bowel sounds, nontender, no organomegaly, no evidence of ascites  EXT: No clubbing, cyanosis, edema, or tenderness  NEURO: Awake, alert, appropriate, speech is fluent       Last Labs:  CBC -  Lab Results   Component Value Date    WBC 8.1 09/12/2024    HGB 12.8 (L) 09/12/2024    HCT 39.4 (L) 09/12/2024    MCV 91 09/12/2024     09/12/2024       CMP -  Lab Results   Component Value Date    CALCIUM 9.0 09/12/2024    PHOS 4.0 09/12/2024    PROT 7.5 09/11/2024    ALBUMIN 3.7 09/12/2024    AST 18 09/11/2024    ALT 14 09/11/2024    ALKPHOS 68 09/11/2024    BILITOT 0.4 09/11/2024       LIPID PANEL -   Lab Results   Component Value Date    CHOL 217 (H) 09/11/2024    TRIG 92 09/11/2024    HDL 63.7 09/11/2024    CHHDL 3.4 09/11/2024    VLDL 18 09/11/2024    NHDL 153 (H) 09/11/2024       RENAL FUNCTION PANEL -   Lab Results   Component Value Date    GLUCOSE 91 09/12/2024     09/12/2024    K 3.9 09/12/2024     09/12/2024    CO2 26 09/12/2024    ANIONGAP 12  09/12/2024    BUN 14 09/12/2024    CREATININE 1.05 09/12/2024    CALCIUM 9.0 09/12/2024    PHOS 4.0 09/12/2024    ALBUMIN 3.7 09/12/2024        Lab Results   Component Value Date    HGBA1C 5.3 09/11/2024         Lab review: I have Chemistry CMP:   Lab Results   Component Value Date    ALBUMIN 3.7 09/12/2024    CALCIUM 9.0 09/12/2024    CO2 26 09/12/2024    CREATININE 1.05 09/12/2024    GLUCOSE 91 09/12/2024    BILITOT 0.4 09/11/2024    PROT 7.5 09/11/2024    ALT 14 09/11/2024    AST 18 09/11/2024    ALKPHOS 68 09/11/2024   , Chemistry BMP   Lab Results   Component Value Date    GLUCOSE 91 09/12/2024    CALCIUM 9.0 09/12/2024    CO2 26 09/12/2024    CREATININE 1.05 09/12/2024   , CBC:  Lab Results   Component Value Date    WBC 8.1 09/12/2024    RBC 4.34 (L) 09/12/2024    HGB 12.8 (L) 09/12/2024    HCT 39.4 (L) 09/12/2024    MCV 91 09/12/2024    MCH 29.5 09/12/2024    MCHC 32.5 09/12/2024    RDW 12.9 09/12/2024    NRBC 0.0 09/12/2024   , and Lipids:   Lab Results   Component Value Date    CHOL 217 (H) 09/11/2024    HDL 63.7 09/11/2024    LDLCALC 135 (H) 09/11/2024    TRIG 92 09/11/2024     Diagnostic review: I have independently interpreted the Echocardiogram and stress test.  My findings are as summarized in the report(s).  .    Assessment/Plan   Assessment & Plan  Precordial pain  Atypical chest pain with normal stress test at greater than 10 METS, September 2024: I reassured the patient.       BMI 31.0-31.9,adult  Risk factor modification: educational materials were provided to the patient.   Risk factor modification: educational materials were provided to the patient.          Palpitations  Monitor study was turned in 2 days ago, but no data is available for me to review.  I will contact the patient upon review of testing and discuss next steps as needed.  I advised him to contact me should he not hear back from me within 2 weeks.    It is probable that his Adderall for ADHD is contributing to his symptoms.  I  advised him to speak with the prescribing provider as to whether alternatives to this medication are available.             Irving Bangura MD

## 2024-10-01 LAB — BODY SURFACE AREA: 2.28 M2
